# Patient Record
Sex: FEMALE | Race: WHITE | NOT HISPANIC OR LATINO | Employment: OTHER | URBAN - METROPOLITAN AREA
[De-identification: names, ages, dates, MRNs, and addresses within clinical notes are randomized per-mention and may not be internally consistent; named-entity substitution may affect disease eponyms.]

---

## 2017-09-07 ENCOUNTER — POST-OP (OUTPATIENT)
Dept: URBAN - METROPOLITAN AREA CLINIC 46 | Facility: CLINIC | Age: 57
End: 2017-09-07

## 2017-09-07 DIAGNOSIS — H11.10: ICD-10-CM

## 2017-09-07 PROCEDURE — 66999PO NON CO-MANAGED OTHER SURGERY PO

## 2017-09-20 ENCOUNTER — EST. PATIENT EMERGENCY (OUTPATIENT)
Dept: URBAN - METROPOLITAN AREA CLINIC 46 | Facility: CLINIC | Age: 57
End: 2017-09-20

## 2017-09-20 DIAGNOSIS — H11.10: ICD-10-CM

## 2017-09-20 PROCEDURE — 66999PO NON CO-MANAGED OTHER SURGERY PO

## 2017-09-20 ASSESSMENT — VISUAL ACUITY
OD_SC: 20/30
OS_SC: 20/40

## 2017-10-12 ENCOUNTER — PREPPED CHART (OUTPATIENT)
Dept: URBAN - METROPOLITAN AREA CLINIC 46 | Facility: CLINIC | Age: 57
End: 2017-10-12

## 2017-10-12 ENCOUNTER — POST-OP (OUTPATIENT)
Dept: URBAN - METROPOLITAN AREA CLINIC 46 | Facility: CLINIC | Age: 57
End: 2017-10-12

## 2017-10-12 DIAGNOSIS — Z96.1: ICD-10-CM

## 2017-10-12 PROCEDURE — 99024 POSTOP FOLLOW-UP VISIT: CPT

## 2017-10-12 ASSESSMENT — VISUAL ACUITY
OS_PH: 20/25
OD_SC: 20/20-2
OS_SC: 20/30-1

## 2017-10-12 ASSESSMENT — TONOMETRY: OS_IOP_MMHG: 15

## 2018-10-10 ENCOUNTER — ESTABLISHED COMPREHENSIVE EXAM (OUTPATIENT)
Dept: URBAN - METROPOLITAN AREA CLINIC 46 | Facility: CLINIC | Age: 58
End: 2018-10-10

## 2018-10-10 DIAGNOSIS — H52.4: ICD-10-CM

## 2018-10-10 PROCEDURE — 92014 COMPRE OPH EXAM EST PT 1/>: CPT

## 2018-10-10 PROCEDURE — 92015 DETERMINE REFRACTIVE STATE: CPT

## 2018-10-10 ASSESSMENT — VISUAL ACUITY
OD_SC: J3
OS_SC: J3
OS_CC: 20/20-2
OS_CC: J1
OS_SC: 20/40+1
OD_CC: 20/20-1
OD_SC: 20/30-1
OD_CC: J1+

## 2018-10-10 ASSESSMENT — TONOMETRY
OD_IOP_MMHG: 15
OS_IOP_MMHG: 15

## 2019-12-02 ENCOUNTER — ESTABLISHED COMPREHENSIVE EXAM (OUTPATIENT)
Dept: URBAN - METROPOLITAN AREA CLINIC 46 | Facility: CLINIC | Age: 59
End: 2019-12-02

## 2019-12-02 DIAGNOSIS — H52.4: ICD-10-CM

## 2019-12-02 PROCEDURE — 92014 COMPRE OPH EXAM EST PT 1/>: CPT

## 2019-12-02 PROCEDURE — 92015 DETERMINE REFRACTIVE STATE: CPT

## 2019-12-02 ASSESSMENT — VISUAL ACUITY
OD_CC: J1
OS_SC: 20/50-1
OS_CC: 20/25
OD_SC: J2
OD_SC: 20/40-2
OS_CC: J1
OD_CC: 20/30
OS_SC: J2

## 2019-12-02 ASSESSMENT — TONOMETRY
OS_IOP_MMHG: 16
OD_IOP_MMHG: 15

## 2021-04-19 ENCOUNTER — ESTABLISHED COMPREHENSIVE EXAM (OUTPATIENT)
Dept: URBAN - METROPOLITAN AREA CLINIC 46 | Facility: CLINIC | Age: 61
End: 2021-04-19

## 2021-04-19 DIAGNOSIS — H52.4: ICD-10-CM

## 2021-04-19 DIAGNOSIS — Z01.00: ICD-10-CM

## 2021-04-19 PROCEDURE — 92015 DETERMINE REFRACTIVE STATE: CPT

## 2021-04-19 PROCEDURE — 92014 COMPRE OPH EXAM EST PT 1/>: CPT

## 2021-04-19 ASSESSMENT — VISUAL ACUITY
OD_SC: 20/30
OD_CC: 20/25-1
OD_OTHER: FL-.25
OD_PH: 20/25
OS_SC: J6
OU_CC: 20/20-2
OS_OTHER: FL-.25
OS_CC: 20/25-2
OU_CC: J1+
OS_SC: 20/50-2
OS_CC: J1
OD_CC: J1
OD_SC: J4

## 2021-04-19 ASSESSMENT — TONOMETRY
OD_IOP_MMHG: 14
OS_IOP_MMHG: 14

## 2021-12-06 ENCOUNTER — EMERGENCY VISIT (OUTPATIENT)
Dept: URBAN - METROPOLITAN AREA CLINIC 46 | Facility: CLINIC | Age: 61
End: 2021-12-06

## 2021-12-06 DIAGNOSIS — H43.391: ICD-10-CM

## 2021-12-06 DIAGNOSIS — H53.8: ICD-10-CM

## 2021-12-06 DIAGNOSIS — H43.811: ICD-10-CM

## 2021-12-06 PROCEDURE — 92134 CPTRZ OPH DX IMG PST SGM RTA: CPT

## 2021-12-06 PROCEDURE — 99214 OFFICE O/P EST MOD 30 MIN: CPT

## 2021-12-06 ASSESSMENT — VISUAL ACUITY
OS_SC: 20/40
OS_PH: 20/25-1
OD_SC: 20/30+1

## 2021-12-06 ASSESSMENT — TONOMETRY
OS_IOP_MMHG: 16
OD_IOP_MMHG: 16

## 2022-04-22 ENCOUNTER — COMPREHENSIVE EXAM (OUTPATIENT)
Dept: URBAN - METROPOLITAN AREA CLINIC 46 | Facility: CLINIC | Age: 62
End: 2022-04-22

## 2022-04-22 DIAGNOSIS — Z98.890: ICD-10-CM

## 2022-04-22 DIAGNOSIS — H25.813: ICD-10-CM

## 2022-04-22 DIAGNOSIS — H52.7: ICD-10-CM

## 2022-04-22 DIAGNOSIS — H52.4: ICD-10-CM

## 2022-04-22 DIAGNOSIS — H04.123: ICD-10-CM

## 2022-04-22 DIAGNOSIS — H35.372: ICD-10-CM

## 2022-04-22 DIAGNOSIS — H43.392: ICD-10-CM

## 2022-04-22 DIAGNOSIS — H43.811: ICD-10-CM

## 2022-04-22 PROCEDURE — 92014 COMPRE OPH EXAM EST PT 1/>: CPT

## 2022-04-22 PROCEDURE — 92134 CPTRZ OPH DX IMG PST SGM RTA: CPT

## 2022-04-22 PROCEDURE — 92015 DETERMINE REFRACTIVE STATE: CPT

## 2022-04-22 ASSESSMENT — VISUAL ACUITY
OU_CC: 20/20
OU_CC: J1+
OS_SC: 20/30-2
OD_CC: J1+
OD_CC: 20/25+2
OS_CC: J1
OS_SC: J5
OS_CC: 20/25-1
OD_SC: 20/30
OD_SC: J4

## 2022-04-22 ASSESSMENT — TONOMETRY
OD_IOP_MMHG: 15
OS_IOP_MMHG: 16

## 2022-12-15 ENCOUNTER — ESTABLISHED PATIENT (OUTPATIENT)
Dept: URBAN - METROPOLITAN AREA CLINIC 46 | Facility: CLINIC | Age: 62
End: 2022-12-15

## 2022-12-15 PROCEDURE — 99213 OFFICE O/P EST LOW 20 MIN: CPT

## 2022-12-15 ASSESSMENT — VISUAL ACUITY
OD_SC: 20/30-2
OU_SC: 20/30
OS_PH: 20/30
OS_SC: 20/40-1

## 2022-12-15 ASSESSMENT — TONOMETRY
OD_IOP_MMHG: 15
OS_IOP_MMHG: 18

## 2022-12-16 ENCOUNTER — CONSULTATION/EVALUATION (OUTPATIENT)
Dept: URBAN - METROPOLITAN AREA CLINIC 43 | Facility: CLINIC | Age: 62
End: 2022-12-16

## 2022-12-16 PROCEDURE — 92250 FUNDUS PHOTOGRAPHY W/I&R: CPT

## 2022-12-16 PROCEDURE — 67145 PROPH RTA DTCHMNT PC: CPT

## 2022-12-16 PROCEDURE — 92235 FLUORESCEIN ANGRPH MLTIFRAME: CPT

## 2022-12-16 PROCEDURE — 99214 OFFICE O/P EST MOD 30 MIN: CPT

## 2022-12-16 ASSESSMENT — VISUAL ACUITY
OD_SC: 20/40+2
OS_SC: 20/40
OS_PH: 20/30-1
OD_PH: 20/30-1

## 2022-12-16 ASSESSMENT — TONOMETRY
OS_IOP_MMHG: 15
OD_IOP_MMHG: 16

## 2022-12-23 ENCOUNTER — FOLLOW UP (OUTPATIENT)
Dept: URBAN - METROPOLITAN AREA CLINIC 47 | Facility: CLINIC | Age: 62
End: 2022-12-23

## 2022-12-23 PROCEDURE — 92012 INTRM OPH EXAM EST PATIENT: CPT

## 2022-12-23 ASSESSMENT — VISUAL ACUITY
OD_SC: 20/30+2
OS_SC: 20/40

## 2022-12-23 ASSESSMENT — TONOMETRY
OS_IOP_MMHG: 16
OD_IOP_MMHG: 16

## 2022-12-28 ENCOUNTER — EMERGENCY VISIT (OUTPATIENT)
Dept: URBAN - METROPOLITAN AREA CLINIC 46 | Facility: CLINIC | Age: 62
End: 2022-12-28

## 2022-12-28 PROCEDURE — 92012 INTRM OPH EXAM EST PATIENT: CPT

## 2022-12-28 ASSESSMENT — VISUAL ACUITY
OD_SC: 20/30
OS_SC: 20/70

## 2022-12-28 ASSESSMENT — TONOMETRY
OS_IOP_MMHG: 13
OD_IOP_MMHG: 13

## 2023-01-03 ENCOUNTER — ESTABLISHED PATIENT (OUTPATIENT)
Dept: URBAN - METROPOLITAN AREA CLINIC 43 | Facility: CLINIC | Age: 63
End: 2023-01-03

## 2023-01-03 DIAGNOSIS — H52.7: ICD-10-CM

## 2023-01-03 DIAGNOSIS — H52.4: ICD-10-CM

## 2023-01-03 DIAGNOSIS — H53.19: ICD-10-CM

## 2023-01-03 DIAGNOSIS — H43.391: ICD-10-CM

## 2023-01-03 DIAGNOSIS — H43.392: ICD-10-CM

## 2023-01-03 DIAGNOSIS — H35.363: ICD-10-CM

## 2023-01-03 DIAGNOSIS — H25.813: ICD-10-CM

## 2023-01-03 DIAGNOSIS — H53.8: ICD-10-CM

## 2023-01-03 DIAGNOSIS — H35.352: ICD-10-CM

## 2023-01-03 DIAGNOSIS — Z98.890: ICD-10-CM

## 2023-01-03 DIAGNOSIS — H35.372: ICD-10-CM

## 2023-01-03 DIAGNOSIS — H04.123: ICD-10-CM

## 2023-01-03 DIAGNOSIS — H35.30: ICD-10-CM

## 2023-01-03 DIAGNOSIS — H43.811: ICD-10-CM

## 2023-01-03 DIAGNOSIS — H33.322: ICD-10-CM

## 2023-01-03 PROCEDURE — 99214 OFFICE O/P EST MOD 30 MIN: CPT

## 2023-01-03 PROCEDURE — 92250 FUNDUS PHOTOGRAPHY W/I&R: CPT

## 2023-01-03 RX ORDER — PREDNISOLONE ACETATE 10 MG/ML
1 SUSPENSION/ DROPS OPHTHALMIC
Start: 2023-01-03

## 2023-01-03 ASSESSMENT — TONOMETRY
OD_IOP_MMHG: 16
OS_IOP_MMHG: 16

## 2023-01-03 ASSESSMENT — VISUAL ACUITY
OS_SC: 20/60+2
OD_SC: 20/20-2
OS_PH: 20/40

## 2023-04-24 ENCOUNTER — COMPREHENSIVE EXAM (OUTPATIENT)
Dept: URBAN - METROPOLITAN AREA CLINIC 46 | Facility: CLINIC | Age: 63
End: 2023-04-24

## 2023-04-24 DIAGNOSIS — H53.8: ICD-10-CM

## 2023-04-24 DIAGNOSIS — H33.322: ICD-10-CM

## 2023-04-24 DIAGNOSIS — H35.30: ICD-10-CM

## 2023-04-24 DIAGNOSIS — H04.123: ICD-10-CM

## 2023-04-24 DIAGNOSIS — H35.352: ICD-10-CM

## 2023-04-24 DIAGNOSIS — H43.393: ICD-10-CM

## 2023-04-24 DIAGNOSIS — H35.372: ICD-10-CM

## 2023-04-24 DIAGNOSIS — H43.813: ICD-10-CM

## 2023-04-24 DIAGNOSIS — H35.363: ICD-10-CM

## 2023-04-24 DIAGNOSIS — H52.4: ICD-10-CM

## 2023-04-24 DIAGNOSIS — H25.813: ICD-10-CM

## 2023-04-24 DIAGNOSIS — Z98.890: ICD-10-CM

## 2023-04-24 DIAGNOSIS — H33.321: ICD-10-CM

## 2023-04-24 DIAGNOSIS — H40.052: ICD-10-CM

## 2023-04-24 DIAGNOSIS — H52.7: ICD-10-CM

## 2023-04-24 DIAGNOSIS — H53.19: ICD-10-CM

## 2023-04-24 PROCEDURE — 92015 DETERMINE REFRACTIVE STATE: CPT

## 2023-04-24 PROCEDURE — 92014 COMPRE OPH EXAM EST PT 1/>: CPT

## 2023-04-24 ASSESSMENT — VISUAL ACUITY
OD_CC: J1
OU_SC: J2
OD_CC: 20/20
OU_SC: 20/40
OU_CC: 20/20
OS_SC: J4
OS_CC: 20/30-1
OD_SC: 20/50-1
OD_SC: J4
OU_CC: J1
OS_CC: J2
OS_SC: 20/60+2

## 2023-04-24 ASSESSMENT — TONOMETRY
OS_IOP_MMHG: 16
OD_IOP_MMHG: 16

## 2023-06-02 ENCOUNTER — APPOINTMENT (RX ONLY)
Dept: URBAN - METROPOLITAN AREA CLINIC 137 | Facility: CLINIC | Age: 63
Setting detail: DERMATOLOGY
End: 2023-06-02

## 2023-06-02 DIAGNOSIS — L57.8 OTHER SKIN CHANGES DUE TO CHRONIC EXPOSURE TO NONIONIZING RADIATION: ICD-10-CM | Status: UNCHANGED

## 2023-06-02 DIAGNOSIS — D18.0 HEMANGIOMA: ICD-10-CM | Status: UNCHANGED

## 2023-06-02 DIAGNOSIS — Z71.89 OTHER SPECIFIED COUNSELING: ICD-10-CM

## 2023-06-02 DIAGNOSIS — L57.0 ACTINIC KERATOSIS: ICD-10-CM

## 2023-06-02 DIAGNOSIS — L20.89 OTHER ATOPIC DERMATITIS: ICD-10-CM

## 2023-06-02 DIAGNOSIS — L30.4 ERYTHEMA INTERTRIGO: ICD-10-CM

## 2023-06-02 DIAGNOSIS — L82.1 OTHER SEBORRHEIC KERATOSIS: ICD-10-CM | Status: UNCHANGED

## 2023-06-02 PROBLEM — D18.01 HEMANGIOMA OF SKIN AND SUBCUTANEOUS TISSUE: Status: ACTIVE | Noted: 2023-06-02

## 2023-06-02 PROCEDURE — 17003 DESTRUCT PREMALG LES 2-14: CPT

## 2023-06-02 PROCEDURE — 99213 OFFICE O/P EST LOW 20 MIN: CPT | Mod: 25

## 2023-06-02 PROCEDURE — ? LIQUID NITROGEN

## 2023-06-02 PROCEDURE — ? OTC TREATMENT REGIMEN

## 2023-06-02 PROCEDURE — ? COUNSELING

## 2023-06-02 PROCEDURE — 17000 DESTRUCT PREMALG LESION: CPT

## 2023-06-02 PROCEDURE — ? SUNSCREEN RECOMMENDATIONS

## 2023-06-02 ASSESSMENT — LOCATION SIMPLE DESCRIPTION DERM
LOCATION SIMPLE: UPPER BACK
LOCATION SIMPLE: RIGHT CHEEK
LOCATION SIMPLE: ABDOMEN
LOCATION SIMPLE: NOSE
LOCATION SIMPLE: LEFT CHEEK
LOCATION SIMPLE: LOWER BACK
LOCATION SIMPLE: CHEST
LOCATION SIMPLE: LEFT BREAST
LOCATION SIMPLE: RIGHT UPPER BACK
LOCATION SIMPLE: RIGHT LOWER BACK

## 2023-06-02 ASSESSMENT — LOCATION DETAILED DESCRIPTION DERM
LOCATION DETAILED: LEFT MEDIAL SUPERIOR CHEST
LOCATION DETAILED: NASAL SUPRATIP
LOCATION DETAILED: RIGHT SUPERIOR UPPER BACK
LOCATION DETAILED: LEFT INFRAMAMMARY CREASE (INNER QUADRANT)
LOCATION DETAILED: RIGHT INFERIOR LATERAL MIDBACK
LOCATION DETAILED: PERIUMBILICAL SKIN
LOCATION DETAILED: RIGHT INFERIOR LATERAL MALAR CHEEK
LOCATION DETAILED: LEFT SUPERIOR CENTRAL MALAR CHEEK
LOCATION DETAILED: SUPERIOR LUMBAR SPINE
LOCATION DETAILED: NASAL DORSUM
LOCATION DETAILED: INFERIOR THORACIC SPINE

## 2023-06-02 ASSESSMENT — LOCATION ZONE DERM
LOCATION ZONE: NOSE
LOCATION ZONE: FACE
LOCATION ZONE: TRUNK

## 2023-06-02 NOTE — PROCEDURE: OTC TREATMENT REGIMEN
Patient Specific Otc Recommendations (Will Not Stick From Patient To Patient): Estrada Horvath
Detail Level: Zone

## 2023-06-02 NOTE — PROCEDURE: LIQUID NITROGEN
Show Applicator Variable?: Yes
Detail Level: Detailed
Duration Of Freeze Thaw-Cycle (Seconds): 1
Post-Care Instructions: I reviewed with the patient in detail post-care instructions. Patient is to wear sunprotection, and avoid picking at any of the treated lesions. Pt may apply Vaseline to crusted or scabbing areas.
Consent: The patient's consent was obtained including but not limited to risks of crusting, scabbing, blistering, scarring, darker or lighter pigmentary change, recurrence, incomplete removal and infection.
Render Note In Bullet Format When Appropriate: No
Application Tool (Optional): Liquid Nitrogen Sprayer
Number Of Freeze-Thaw Cycles: 2 freeze-thaw cycles

## 2023-07-11 ENCOUNTER — COMPREHENSIVE EXAM (OUTPATIENT)
Dept: URBAN - METROPOLITAN AREA CLINIC 46 | Facility: CLINIC | Age: 63
End: 2023-07-11

## 2023-07-11 DIAGNOSIS — H33.321: ICD-10-CM

## 2023-07-11 DIAGNOSIS — H43.813: ICD-10-CM

## 2023-07-11 DIAGNOSIS — H35.30: ICD-10-CM

## 2023-07-11 DIAGNOSIS — H33.322: ICD-10-CM

## 2023-07-11 DIAGNOSIS — H04.123: ICD-10-CM

## 2023-07-11 DIAGNOSIS — H35.352: ICD-10-CM

## 2023-07-11 DIAGNOSIS — H35.372: ICD-10-CM

## 2023-07-11 DIAGNOSIS — H43.393: ICD-10-CM

## 2023-07-11 DIAGNOSIS — H35.363: ICD-10-CM

## 2023-07-11 PROCEDURE — 99214 OFFICE O/P EST MOD 30 MIN: CPT

## 2023-07-11 PROCEDURE — 92250 FUNDUS PHOTOGRAPHY W/I&R: CPT

## 2023-07-11 ASSESSMENT — VISUAL ACUITY
OS_SC: 20/50+1
OD_SC: 20/20
OS_PH: 20/40+1

## 2023-07-11 ASSESSMENT — TONOMETRY
OD_IOP_MMHG: 18
OS_IOP_MMHG: 18

## 2024-02-01 ENCOUNTER — EMERGENCY VISIT (OUTPATIENT)
Dept: URBAN - METROPOLITAN AREA CLINIC 46 | Facility: CLINIC | Age: 64
End: 2024-02-01

## 2024-02-01 VITALS
HEART RATE: 74 BPM | HEIGHT: 55 IN | RESPIRATION RATE: 18 BRPM | DIASTOLIC BLOOD PRESSURE: 82 MMHG | SYSTOLIC BLOOD PRESSURE: 148 MMHG

## 2024-02-01 DIAGNOSIS — H35.372: ICD-10-CM

## 2024-02-01 DIAGNOSIS — H33.322: ICD-10-CM

## 2024-02-01 DIAGNOSIS — H43.393: ICD-10-CM

## 2024-02-01 DIAGNOSIS — G43.B0: ICD-10-CM

## 2024-02-01 DIAGNOSIS — H35.352: ICD-10-CM

## 2024-02-01 DIAGNOSIS — H35.363: ICD-10-CM

## 2024-02-01 DIAGNOSIS — H33.321: ICD-10-CM

## 2024-02-01 DIAGNOSIS — H35.30: ICD-10-CM

## 2024-02-01 DIAGNOSIS — H43.813: ICD-10-CM

## 2024-02-01 DIAGNOSIS — H04.123: ICD-10-CM

## 2024-02-01 DIAGNOSIS — H53.8: ICD-10-CM

## 2024-02-01 PROCEDURE — 99213 OFFICE O/P EST LOW 20 MIN: CPT

## 2024-02-01 PROCEDURE — 92134 CPTRZ OPH DX IMG PST SGM RTA: CPT

## 2024-02-01 ASSESSMENT — VISUAL ACUITY
OS_SC: 20/70
OD_PH: 20/20-1
OD_SC: 20/25
OS_PH: 20/50
OU_SC: 20/25

## 2024-02-01 ASSESSMENT — TONOMETRY
OD_IOP_MMHG: 17
OS_IOP_MMHG: 18

## 2024-08-09 ENCOUNTER — APPOINTMENT (RX ONLY)
Dept: URBAN - METROPOLITAN AREA CLINIC 137 | Facility: CLINIC | Age: 64
Setting detail: DERMATOLOGY
End: 2024-08-09

## 2024-08-09 DIAGNOSIS — L81.4 OTHER MELANIN HYPERPIGMENTATION: ICD-10-CM

## 2024-08-09 DIAGNOSIS — D18.0 HEMANGIOMA: ICD-10-CM

## 2024-08-09 DIAGNOSIS — L57.0 ACTINIC KERATOSIS: ICD-10-CM

## 2024-08-09 DIAGNOSIS — Z71.89 OTHER SPECIFIED COUNSELING: ICD-10-CM

## 2024-08-09 DIAGNOSIS — L57.8 OTHER SKIN CHANGES DUE TO CHRONIC EXPOSURE TO NONIONIZING RADIATION: ICD-10-CM

## 2024-08-09 DIAGNOSIS — L73.8 OTHER SPECIFIED FOLLICULAR DISORDERS: ICD-10-CM

## 2024-08-09 DIAGNOSIS — L82.1 OTHER SEBORRHEIC KERATOSIS: ICD-10-CM

## 2024-08-09 PROBLEM — D18.01 HEMANGIOMA OF SKIN AND SUBCUTANEOUS TISSUE: Status: ACTIVE | Noted: 2024-08-09

## 2024-08-09 PROCEDURE — 99213 OFFICE O/P EST LOW 20 MIN: CPT | Mod: 25

## 2024-08-09 PROCEDURE — ? COUNSELING

## 2024-08-09 PROCEDURE — 17003 DESTRUCT PREMALG LES 2-14: CPT

## 2024-08-09 PROCEDURE — ? LIQUID NITROGEN

## 2024-08-09 PROCEDURE — 17000 DESTRUCT PREMALG LESION: CPT

## 2024-08-09 PROCEDURE — ? ADDITIONAL NOTES

## 2024-08-09 ASSESSMENT — LOCATION DETAILED DESCRIPTION DERM
LOCATION DETAILED: LEFT MEDIAL FOREHEAD
LOCATION DETAILED: UPPER STERNUM
LOCATION DETAILED: SUPERIOR LUMBAR SPINE
LOCATION DETAILED: LEFT ANTERIOR SHOULDER
LOCATION DETAILED: RIGHT RADIAL DORSAL HAND
LOCATION DETAILED: STERNUM
LOCATION DETAILED: EPIGASTRIC SKIN
LOCATION DETAILED: SUPERIOR THORACIC SPINE
LOCATION DETAILED: RIGHT SUPERIOR UPPER BACK
LOCATION DETAILED: RIGHT PROXIMAL DORSAL FOREARM

## 2024-08-09 ASSESSMENT — LOCATION ZONE DERM
LOCATION ZONE: HAND
LOCATION ZONE: FACE
LOCATION ZONE: TRUNK
LOCATION ZONE: ARM

## 2024-08-09 ASSESSMENT — LOCATION SIMPLE DESCRIPTION DERM
LOCATION SIMPLE: LEFT FOREHEAD
LOCATION SIMPLE: ABDOMEN
LOCATION SIMPLE: LOWER BACK
LOCATION SIMPLE: UPPER BACK
LOCATION SIMPLE: CHEST
LOCATION SIMPLE: RIGHT UPPER BACK
LOCATION SIMPLE: LEFT SHOULDER
LOCATION SIMPLE: RIGHT FOREARM
LOCATION SIMPLE: RIGHT HAND

## 2024-08-09 NOTE — PROCEDURE: ADDITIONAL NOTES
Additional Notes: Pt informed to us [place] rock cream
Detail Level: Simple
Render Risk Assessment In Note?: no

## 2024-08-09 NOTE — PROCEDURE: LIQUID NITROGEN
Application Tool (Optional): Liquid Nitrogen Sprayer
Render Note In Bullet Format When Appropriate: No
Detail Level: Detailed
Show Applicator Variable?: Yes
Consent: The patient's consent was obtained including but not limited to risks of crusting, scabbing, blistering, scarring, darker or lighter pigmentary change, recurrence, incomplete removal and infection.
Number Of Freeze-Thaw Cycles: 2 freeze-thaw cycles
Duration Of Freeze Thaw-Cycle (Seconds): 1
Post-Care Instructions: I reviewed with the patient in detail post-care instructions. Patient is to wear sunprotection, and avoid picking at any of the treated lesions. Pt may apply Vaseline to crusted or scabbing areas.

## 2024-08-14 ENCOUNTER — FOLLOW UP (OUTPATIENT)
Dept: URBAN - METROPOLITAN AREA CLINIC 46 | Facility: CLINIC | Age: 64
End: 2024-08-14

## 2024-08-14 DIAGNOSIS — H35.372: ICD-10-CM

## 2024-08-14 DIAGNOSIS — H43.813: ICD-10-CM

## 2024-08-14 DIAGNOSIS — H35.352: ICD-10-CM

## 2024-08-14 DIAGNOSIS — H35.363: ICD-10-CM

## 2024-08-14 DIAGNOSIS — G43.B0: ICD-10-CM

## 2024-08-14 PROCEDURE — 92134 CPTRZ OPH DX IMG PST SGM RTA: CPT

## 2024-08-14 PROCEDURE — 92014 COMPRE OPH EXAM EST PT 1/>: CPT

## 2024-08-14 ASSESSMENT — VISUAL ACUITY
OU_CC: 20/20-1
OS_CC: 20/40+1
OD_CC: 20/20

## 2024-08-14 ASSESSMENT — TONOMETRY
OD_IOP_MMHG: 17
OS_IOP_MMHG: 17

## 2025-04-03 ENCOUNTER — CONTACT LENSES/GLASSES VISIT (OUTPATIENT)
Age: 65
End: 2025-04-03

## 2025-04-03 DIAGNOSIS — Z98.890: ICD-10-CM

## 2025-04-03 DIAGNOSIS — G43.B0: ICD-10-CM

## 2025-04-03 DIAGNOSIS — H43.813: ICD-10-CM

## 2025-04-03 DIAGNOSIS — H35.352: ICD-10-CM

## 2025-04-03 DIAGNOSIS — H52.7: ICD-10-CM

## 2025-04-03 DIAGNOSIS — H43.391: ICD-10-CM

## 2025-04-03 DIAGNOSIS — H33.321: ICD-10-CM

## 2025-04-03 DIAGNOSIS — H35.363: ICD-10-CM

## 2025-04-03 DIAGNOSIS — H35.372: ICD-10-CM

## 2025-04-03 DIAGNOSIS — H33.322: ICD-10-CM

## 2025-04-03 PROCEDURE — 92015GRNC REFRACTION GLASSES RECHECK - NO CHARGE

## 2025-06-13 NOTE — PROCEDURE: COUNSELING
Patient acknowledged understanding. All questions answered at this time.     ----- Message from Radha Soliman sent at 6/13/2025  3:05 PM EDT -----  done  ----- Message -----  From: Alma Rosa Fox RN  Sent: 6/13/2025   2:18 PM EDT  To: Radha Soliman APRN-CNP    Patient is unable to afford her inhalers due to her deductible. Patient has been sent multiple inhalers and they are all too expensive. Can we try the pharmacy referral?  
Detail Level: Generalized
Detail Level: Detailed

## 2025-08-21 ENCOUNTER — APPOINTMENT (OUTPATIENT)
Dept: URBAN - METROPOLITAN AREA CLINIC 137 | Facility: CLINIC | Age: 65
Setting detail: DERMATOLOGY
End: 2025-08-21

## 2025-08-21 DIAGNOSIS — L82.1 OTHER SEBORRHEIC KERATOSIS: ICD-10-CM | Status: UNCHANGED

## 2025-08-21 DIAGNOSIS — D18.0 HEMANGIOMA: ICD-10-CM | Status: UNCHANGED

## 2025-08-21 DIAGNOSIS — Z71.89 OTHER SPECIFIED COUNSELING: ICD-10-CM

## 2025-08-21 DIAGNOSIS — L57.0 ACTINIC KERATOSIS: ICD-10-CM | Status: INADEQUATELY CONTROLLED

## 2025-08-21 DIAGNOSIS — L30.4 ERYTHEMA INTERTRIGO: ICD-10-CM | Status: INADEQUATELY CONTROLLED

## 2025-08-21 DIAGNOSIS — L57.8 OTHER SKIN CHANGES DUE TO CHRONIC EXPOSURE TO NONIONIZING RADIATION: ICD-10-CM | Status: UNCHANGED

## 2025-08-21 DIAGNOSIS — L81.4 OTHER MELANIN HYPERPIGMENTATION: ICD-10-CM

## 2025-08-21 DIAGNOSIS — L82.0 INFLAMED SEBORRHEIC KERATOSIS: ICD-10-CM | Status: INADEQUATELY CONTROLLED

## 2025-08-21 PROBLEM — D18.01 HEMANGIOMA OF SKIN AND SUBCUTANEOUS TISSUE: Status: ACTIVE | Noted: 2025-08-21

## 2025-08-21 PROCEDURE — ? SUNSCREEN RECOMMENDATIONS

## 2025-08-21 PROCEDURE — ?: Mod: 59

## 2025-08-21 PROCEDURE — ?: Mod: 25

## 2025-08-21 PROCEDURE — ? MEDICATION COUNSELING

## 2025-08-21 PROCEDURE — ?

## 2025-08-21 PROCEDURE — ? COUNSELING

## 2025-08-21 PROCEDURE — ? OTC TREATMENT REGIMEN

## 2025-08-21 PROCEDURE — ? LIQUID NITROGEN

## 2025-08-21 ASSESSMENT — LOCATION SIMPLE DESCRIPTION DERM
LOCATION SIMPLE: RIGHT UPPER BACK
LOCATION SIMPLE: ABDOMEN
LOCATION SIMPLE: RIGHT FOREHEAD
LOCATION SIMPLE: UPPER BACK
LOCATION SIMPLE: LEFT PRETIBIAL REGION
LOCATION SIMPLE: CHEST
LOCATION SIMPLE: RIGHT BREAST
LOCATION SIMPLE: RIGHT CHEEK
LOCATION SIMPLE: LEFT BREAST

## 2025-08-21 ASSESSMENT — LOCATION DETAILED DESCRIPTION DERM
LOCATION DETAILED: LEFT DISTAL PRETIBIAL REGION
LOCATION DETAILED: RIGHT SUPERIOR UPPER BACK
LOCATION DETAILED: MIDDLE STERNUM
LOCATION DETAILED: LEFT INFRAMAMMARY CREASE (INNER QUADRANT)
LOCATION DETAILED: SUPERIOR THORACIC SPINE
LOCATION DETAILED: RIGHT INFERIOR CENTRAL MALAR CHEEK
LOCATION DETAILED: INFERIOR THORACIC SPINE
LOCATION DETAILED: RIGHT INFERIOR MEDIAL FOREHEAD
LOCATION DETAILED: UPPER STERNUM
LOCATION DETAILED: PERIUMBILICAL SKIN
LOCATION DETAILED: RIGHT INFRAMAMMARY CREASE (INNER QUADRANT)

## 2025-08-21 ASSESSMENT — LOCATION ZONE DERM
LOCATION ZONE: FACE
LOCATION ZONE: LEG
LOCATION ZONE: TRUNK